# Patient Record
Sex: MALE | Race: WHITE | NOT HISPANIC OR LATINO | Employment: UNEMPLOYED | URBAN - METROPOLITAN AREA
[De-identification: names, ages, dates, MRNs, and addresses within clinical notes are randomized per-mention and may not be internally consistent; named-entity substitution may affect disease eponyms.]

---

## 2022-01-31 ENCOUNTER — HOSPITAL ENCOUNTER (INPATIENT)
Facility: HOSPITAL | Age: 47
LOS: 1 days | Discharge: HOME | DRG: 897 | End: 2022-02-01
Attending: EMERGENCY MEDICINE | Admitting: FAMILY MEDICINE

## 2022-01-31 DIAGNOSIS — R41.82 AMS (ALTERED MENTAL STATUS): ICD-10-CM

## 2022-01-31 DIAGNOSIS — F19.10 DRUG ABUSE: Primary | ICD-10-CM

## 2022-01-31 PROBLEM — F15.921: Status: ACTIVE | Noted: 2022-01-31

## 2022-01-31 PROBLEM — R45.1 AGITATION: Status: ACTIVE | Noted: 2022-01-31

## 2022-01-31 PROBLEM — T50.901A DRUG OVERDOSE: Status: ACTIVE | Noted: 2022-01-31

## 2022-01-31 PROBLEM — D72.829 LEUKOCYTOSIS: Status: ACTIVE | Noted: 2022-01-31

## 2022-01-31 LAB
ALBUMIN SERPL BCP-MCNC: 4.4 G/DL (ref 3.5–5.2)
ALP SERPL-CCNC: 65 U/L (ref 55–135)
ALT SERPL W/O P-5'-P-CCNC: 17 U/L (ref 10–44)
AMMONIA PLAS-SCNC: 45 UMOL/L (ref 10–50)
AMPHET+METHAMPHET UR QL: ABNORMAL
ANION GAP SERPL CALC-SCNC: 15 MMOL/L (ref 8–16)
AST SERPL-CCNC: 28 U/L (ref 10–40)
BARBITURATES UR QL SCN>200 NG/ML: NEGATIVE
BASOPHILS # BLD AUTO: 0.11 K/UL (ref 0–0.2)
BASOPHILS NFR BLD: 0.8 % (ref 0–1.9)
BENZODIAZ UR QL SCN>200 NG/ML: NEGATIVE
BILIRUB SERPL-MCNC: 0.8 MG/DL (ref 0.1–1)
BILIRUB UR QL STRIP: NEGATIVE
BNP SERPL-MCNC: <10 PG/ML (ref 0–99)
BUN SERPL-MCNC: 14 MG/DL (ref 6–20)
BZE UR QL SCN: NEGATIVE
CALCIUM SERPL-MCNC: 9.5 MG/DL (ref 8.7–10.5)
CANNABINOIDS UR QL SCN: NEGATIVE
CHLORIDE SERPL-SCNC: 105 MMOL/L (ref 95–110)
CK SERPL-CCNC: 170 U/L (ref 20–200)
CLARITY UR: CLEAR
CO2 SERPL-SCNC: 20 MMOL/L (ref 23–29)
COLOR UR: YELLOW
CREAT SERPL-MCNC: 0.8 MG/DL (ref 0.5–1.4)
CREAT UR-MCNC: 70.4 MG/DL (ref 23–375)
CTP QC/QA: YES
DIFFERENTIAL METHOD: ABNORMAL
EOSINOPHIL # BLD AUTO: 0.3 K/UL (ref 0–0.5)
EOSINOPHIL NFR BLD: 2.5 % (ref 0–8)
ERYTHROCYTE [DISTWIDTH] IN BLOOD BY AUTOMATED COUNT: 11.6 % (ref 11.5–14.5)
EST. GFR  (AFRICAN AMERICAN): >60 ML/MIN/1.73 M^2
EST. GFR  (NON AFRICAN AMERICAN): >60 ML/MIN/1.73 M^2
ETHANOL SERPL-MCNC: 81 MG/DL
ETHANOL SERPL-MCNC: <10 MG/DL
GLUCOSE SERPL-MCNC: 91 MG/DL (ref 70–110)
GLUCOSE UR QL STRIP: NEGATIVE
HCT VFR BLD AUTO: 39.2 % (ref 40–54)
HGB BLD-MCNC: 13.8 G/DL (ref 14–18)
HGB UR QL STRIP: NEGATIVE
IMM GRANULOCYTES # BLD AUTO: 0.05 K/UL (ref 0–0.04)
IMM GRANULOCYTES NFR BLD AUTO: 0.4 % (ref 0–0.5)
KETONES UR QL STRIP: ABNORMAL
LEUKOCYTE ESTERASE UR QL STRIP: NEGATIVE
LYMPHOCYTES # BLD AUTO: 2.3 K/UL (ref 1–4.8)
LYMPHOCYTES NFR BLD: 16.8 % (ref 18–48)
MCH RBC QN AUTO: 30.8 PG (ref 27–31)
MCHC RBC AUTO-ENTMCNC: 35.2 G/DL (ref 32–36)
MCV RBC AUTO: 88 FL (ref 82–98)
METHADONE UR QL SCN>300 NG/ML: NEGATIVE
MONOCYTES # BLD AUTO: 0.8 K/UL (ref 0.3–1)
MONOCYTES NFR BLD: 5.7 % (ref 4–15)
NEUTROPHILS # BLD AUTO: 10 K/UL (ref 1.8–7.7)
NEUTROPHILS NFR BLD: 73.8 % (ref 38–73)
NITRITE UR QL STRIP: NEGATIVE
NRBC BLD-RTO: 0 /100 WBC
OPIATES UR QL SCN: ABNORMAL
PCP UR QL SCN>25 NG/ML: NEGATIVE
PH UR STRIP: >8 [PH] (ref 5–8)
PLATELET # BLD AUTO: 340 K/UL (ref 150–450)
PMV BLD AUTO: 8.8 FL (ref 9.2–12.9)
POTASSIUM SERPL-SCNC: 3.9 MMOL/L (ref 3.5–5.1)
PROT SERPL-MCNC: 7.5 G/DL (ref 6–8.4)
PROT UR QL STRIP: NEGATIVE
RBC # BLD AUTO: 4.48 M/UL (ref 4.6–6.2)
SARS-COV-2 RDRP RESP QL NAA+PROBE: NEGATIVE
SODIUM SERPL-SCNC: 140 MMOL/L (ref 136–145)
SP GR UR STRIP: 1.01 (ref 1–1.03)
T4 FREE SERPL-MCNC: 1.09 NG/DL (ref 0.71–1.51)
TOXICOLOGY INFORMATION: ABNORMAL
TROPONIN I SERPL DL<=0.01 NG/ML-MCNC: <0.006 NG/ML (ref 0–0.03)
TSH SERPL DL<=0.005 MIU/L-ACNC: 0.33 UIU/ML (ref 0.4–4)
URN SPEC COLLECT METH UR: ABNORMAL
UROBILINOGEN UR STRIP-ACNC: NEGATIVE EU/DL
WBC # BLD AUTO: 13.52 K/UL (ref 3.9–12.7)

## 2022-01-31 PROCEDURE — 83880 ASSAY OF NATRIURETIC PEPTIDE: CPT | Performed by: EMERGENCY MEDICINE

## 2022-01-31 PROCEDURE — 25000003 PHARM REV CODE 250: Performed by: EMERGENCY MEDICINE

## 2022-01-31 PROCEDURE — 81003 URINALYSIS AUTO W/O SCOPE: CPT | Mod: 59 | Performed by: EMERGENCY MEDICINE

## 2022-01-31 PROCEDURE — 63600175 PHARM REV CODE 636 W HCPCS: Performed by: NURSE PRACTITIONER

## 2022-01-31 PROCEDURE — 99291 CRITICAL CARE FIRST HOUR: CPT | Mod: ,,, | Performed by: NURSE PRACTITIONER

## 2022-01-31 PROCEDURE — 85025 COMPLETE CBC W/AUTO DIFF WBC: CPT | Performed by: EMERGENCY MEDICINE

## 2022-01-31 PROCEDURE — 20000000 HC ICU ROOM

## 2022-01-31 PROCEDURE — 36415 COLL VENOUS BLD VENIPUNCTURE: CPT | Performed by: NURSE PRACTITIONER

## 2022-01-31 PROCEDURE — C9399 UNCLASSIFIED DRUGS OR BIOLOG: HCPCS | Performed by: EMERGENCY MEDICINE

## 2022-01-31 PROCEDURE — 82077 ASSAY SPEC XCP UR&BREATH IA: CPT | Performed by: NURSE PRACTITIONER

## 2022-01-31 PROCEDURE — 96372 THER/PROPH/DIAG INJ SC/IM: CPT

## 2022-01-31 PROCEDURE — 96376 TX/PRO/DX INJ SAME DRUG ADON: CPT

## 2022-01-31 PROCEDURE — 25000003 PHARM REV CODE 250: Performed by: FAMILY MEDICINE

## 2022-01-31 PROCEDURE — 82140 ASSAY OF AMMONIA: CPT | Performed by: EMERGENCY MEDICINE

## 2022-01-31 PROCEDURE — 84443 ASSAY THYROID STIM HORMONE: CPT | Performed by: EMERGENCY MEDICINE

## 2022-01-31 PROCEDURE — 80053 COMPREHEN METABOLIC PANEL: CPT | Performed by: EMERGENCY MEDICINE

## 2022-01-31 PROCEDURE — 96374 THER/PROPH/DIAG INJ IV PUSH: CPT

## 2022-01-31 PROCEDURE — 82550 ASSAY OF CK (CPK): CPT | Performed by: EMERGENCY MEDICINE

## 2022-01-31 PROCEDURE — 25000003 PHARM REV CODE 250: Performed by: NURSE PRACTITIONER

## 2022-01-31 PROCEDURE — 63600175 PHARM REV CODE 636 W HCPCS: Performed by: INTERNAL MEDICINE

## 2022-01-31 PROCEDURE — 84484 ASSAY OF TROPONIN QUANT: CPT | Performed by: EMERGENCY MEDICINE

## 2022-01-31 PROCEDURE — 84439 ASSAY OF FREE THYROXINE: CPT | Performed by: EMERGENCY MEDICINE

## 2022-01-31 PROCEDURE — 96375 TX/PRO/DX INJ NEW DRUG ADDON: CPT

## 2022-01-31 PROCEDURE — 63600175 PHARM REV CODE 636 W HCPCS: Performed by: EMERGENCY MEDICINE

## 2022-01-31 PROCEDURE — 82077 ASSAY SPEC XCP UR&BREATH IA: CPT | Mod: 91 | Performed by: EMERGENCY MEDICINE

## 2022-01-31 PROCEDURE — 99291 PR CRITICAL CARE, E/M 30-74 MINUTES: ICD-10-PCS | Mod: ,,, | Performed by: NURSE PRACTITIONER

## 2022-01-31 PROCEDURE — 80307 DRUG TEST PRSMV CHEM ANLYZR: CPT | Performed by: EMERGENCY MEDICINE

## 2022-01-31 PROCEDURE — 99285 EMERGENCY DEPT VISIT HI MDM: CPT | Mod: 25

## 2022-01-31 PROCEDURE — U0002 COVID-19 LAB TEST NON-CDC: HCPCS | Performed by: EMERGENCY MEDICINE

## 2022-01-31 PROCEDURE — 96361 HYDRATE IV INFUSION ADD-ON: CPT

## 2022-01-31 RX ORDER — DEXMEDETOMIDINE HYDROCHLORIDE 4 UG/ML
0-1.4 INJECTION INTRAVENOUS CONTINUOUS
Status: DISCONTINUED | OUTPATIENT
Start: 2022-01-31 | End: 2022-02-01

## 2022-01-31 RX ORDER — DIPHENHYDRAMINE HYDROCHLORIDE 50 MG/ML
25 INJECTION INTRAMUSCULAR; INTRAVENOUS
Status: COMPLETED | OUTPATIENT
Start: 2022-01-31 | End: 2022-01-31

## 2022-01-31 RX ORDER — LORAZEPAM 2 MG/ML
2 INJECTION INTRAMUSCULAR EVERY 4 HOURS PRN
Status: DISCONTINUED | OUTPATIENT
Start: 2022-01-31 | End: 2022-02-01

## 2022-01-31 RX ORDER — LORAZEPAM 2 MG/ML
2 INJECTION INTRAMUSCULAR
Status: DISCONTINUED | OUTPATIENT
Start: 2022-01-31 | End: 2022-01-31

## 2022-01-31 RX ORDER — MUPIROCIN 20 MG/G
OINTMENT TOPICAL 2 TIMES DAILY
Status: DISCONTINUED | OUTPATIENT
Start: 2022-01-31 | End: 2022-02-01 | Stop reason: HOSPADM

## 2022-01-31 RX ORDER — ONDANSETRON 2 MG/ML
4 INJECTION INTRAMUSCULAR; INTRAVENOUS EVERY 8 HOURS PRN
Status: DISCONTINUED | OUTPATIENT
Start: 2022-01-31 | End: 2022-02-01 | Stop reason: HOSPADM

## 2022-01-31 RX ORDER — SODIUM CHLORIDE 0.9 % (FLUSH) 0.9 %
10 SYRINGE (ML) INJECTION
Status: DISCONTINUED | OUTPATIENT
Start: 2022-01-31 | End: 2022-02-01 | Stop reason: HOSPADM

## 2022-01-31 RX ORDER — DIAZEPAM 10 MG/2ML
10 INJECTION INTRAMUSCULAR ONCE
Status: COMPLETED | OUTPATIENT
Start: 2022-01-31 | End: 2022-01-31

## 2022-01-31 RX ORDER — HYDRALAZINE HYDROCHLORIDE 20 MG/ML
10 INJECTION INTRAMUSCULAR; INTRAVENOUS EVERY 6 HOURS PRN
Status: DISCONTINUED | OUTPATIENT
Start: 2022-01-31 | End: 2022-02-01 | Stop reason: HOSPADM

## 2022-01-31 RX ORDER — LORAZEPAM 2 MG/ML
1 INJECTION INTRAMUSCULAR
Status: COMPLETED | OUTPATIENT
Start: 2022-01-31 | End: 2022-01-31

## 2022-01-31 RX ORDER — HALOPERIDOL 5 MG/ML
5 INJECTION INTRAMUSCULAR
Status: COMPLETED | OUTPATIENT
Start: 2022-01-31 | End: 2022-01-31

## 2022-01-31 RX ORDER — DIPHENHYDRAMINE HYDROCHLORIDE 50 MG/ML
25 INJECTION INTRAMUSCULAR; INTRAVENOUS
Status: DISCONTINUED | OUTPATIENT
Start: 2022-01-31 | End: 2022-01-31

## 2022-01-31 RX ORDER — LORAZEPAM 2 MG/ML
2 INJECTION INTRAMUSCULAR
Status: COMPLETED | OUTPATIENT
Start: 2022-01-31 | End: 2022-01-31

## 2022-01-31 RX ORDER — ETOMIDATE 2 MG/ML
INJECTION INTRAVENOUS
Status: DISCONTINUED
Start: 2022-01-31 | End: 2022-01-31 | Stop reason: WASHOUT

## 2022-01-31 RX ORDER — ZIPRASIDONE MESYLATE 20 MG/ML
20 INJECTION, POWDER, LYOPHILIZED, FOR SOLUTION INTRAMUSCULAR
Status: COMPLETED | OUTPATIENT
Start: 2022-01-31 | End: 2022-01-31

## 2022-01-31 RX ORDER — DIAZEPAM 10 MG/2ML
10 INJECTION INTRAMUSCULAR ONCE
Status: DISCONTINUED | OUTPATIENT
Start: 2022-01-31 | End: 2022-01-31

## 2022-01-31 RX ORDER — SUCCINYLCHOLINE CHLORIDE 20 MG/ML
INJECTION INTRAMUSCULAR; INTRAVENOUS
Status: DISCONTINUED
Start: 2022-01-31 | End: 2022-01-31 | Stop reason: WASHOUT

## 2022-01-31 RX ORDER — FAMOTIDINE 10 MG/ML
20 INJECTION INTRAVENOUS 2 TIMES DAILY
Status: DISCONTINUED | OUTPATIENT
Start: 2022-01-31 | End: 2022-02-01

## 2022-01-31 RX ORDER — ENOXAPARIN SODIUM 100 MG/ML
40 INJECTION SUBCUTANEOUS EVERY 24 HOURS
Status: DISCONTINUED | OUTPATIENT
Start: 2022-01-31 | End: 2022-02-01 | Stop reason: HOSPADM

## 2022-01-31 RX ADMIN — DEXMEDETOMIDINE HYDROCHLORIDE 0.4 MCG/KG/HR: 4 INJECTION INTRAVENOUS at 10:01

## 2022-01-31 RX ADMIN — FOLIC ACID: 5 INJECTION, SOLUTION INTRAMUSCULAR; INTRAVENOUS; SUBCUTANEOUS at 03:01

## 2022-01-31 RX ADMIN — DEXMEDETOMIDINE HYDROCHLORIDE 1 MCG/KG/HR: 4 INJECTION INTRAVENOUS at 10:01

## 2022-01-31 RX ADMIN — LORAZEPAM 2 MG: 2 INJECTION INTRAMUSCULAR at 07:01

## 2022-01-31 RX ADMIN — DEXMEDETOMIDINE HYDROCHLORIDE 1.2 MCG/KG/HR: 4 INJECTION INTRAVENOUS at 10:01

## 2022-01-31 RX ADMIN — LORAZEPAM 2 MG: 2 INJECTION INTRAMUSCULAR; INTRAVENOUS at 06:01

## 2022-01-31 RX ADMIN — MUPIROCIN: 20 OINTMENT TOPICAL at 09:01

## 2022-01-31 RX ADMIN — FAMOTIDINE 20 MG: 10 INJECTION INTRAVENOUS at 09:01

## 2022-01-31 RX ADMIN — LORAZEPAM 1 MG: 2 INJECTION INTRAMUSCULAR; INTRAVENOUS at 09:01

## 2022-01-31 RX ADMIN — DIPHENHYDRAMINE HYDROCHLORIDE 25 MG: 50 INJECTION, SOLUTION INTRAMUSCULAR; INTRAVENOUS at 07:01

## 2022-01-31 RX ADMIN — DEXMEDETOMIDINE HYDROCHLORIDE 1 MCG/KG/HR: 4 INJECTION INTRAVENOUS at 05:01

## 2022-01-31 RX ADMIN — DIAZEPAM 10 MG: 10 INJECTION, SOLUTION INTRAMUSCULAR; INTRAVENOUS at 11:01

## 2022-01-31 RX ADMIN — DEXMEDETOMIDINE HYDROCHLORIDE 1.4 MCG/KG/HR: 4 INJECTION INTRAVENOUS at 02:01

## 2022-01-31 RX ADMIN — HALOPERIDOL LACTATE 5 MG: 5 INJECTION, SOLUTION INTRAMUSCULAR at 07:01

## 2022-01-31 RX ADMIN — ZIPRASIDONE MESYLATE 20 MG: 20 INJECTION, POWDER, LYOPHILIZED, FOR SOLUTION INTRAMUSCULAR at 09:01

## 2022-01-31 RX ADMIN — DEXMEDETOMIDINE HYDROCHLORIDE 1 MCG/KG/HR: 4 INJECTION INTRAVENOUS at 11:01

## 2022-01-31 RX ADMIN — DEXMEDETOMIDINE HYDROCHLORIDE 0.6 MCG/KG/HR: 4 INJECTION INTRAVENOUS at 10:01

## 2022-01-31 RX ADMIN — ENOXAPARIN SODIUM 40 MG: 100 INJECTION SUBCUTANEOUS at 05:01

## 2022-01-31 RX ADMIN — SODIUM CHLORIDE 1000 ML: 0.9 INJECTION, SOLUTION INTRAVENOUS at 07:01

## 2022-01-31 RX ADMIN — SODIUM CHLORIDE 1000 ML: 0.9 INJECTION, SOLUTION INTRAVENOUS at 09:01

## 2022-01-31 RX ADMIN — MUPIROCIN: 20 OINTMENT TOPICAL at 11:01

## 2022-01-31 NOTE — ASSESSMENT & PLAN NOTE
Endorses snorting methamphetamine  Valium given IM for safety with loss of IV access and uncontrolled hyperactive and spastic behaviour  precedex infusion, RASS goal 0 to -1  Prn benzo  ICU neuro and respiratory monitoring  Soft wrist restraints to prevent pulling lines  CPK wnl on presentation; repeat in am given degree of hyperactive agitation

## 2022-01-31 NOTE — CONSULTS
[facility] ([facility])  Critical Care Medicine  Consult Note    Patient Name: Edinson Hamm  MRN: [id number]  Admission Date: 1/31/2022  Hospital Length of Stay: 0 days  Code Status: Full Code  Attending Physician: Nishant Kapoor MD   Primary Care Provider: Primary Doctor No   Principal Problem: Amphetamine and psychostimulant intoxication with delirium      Subjective:     HPI:  46 year old male with unknown PMH presented to ED with hyperactive agitation  Brought in by friend who reported pt awakening this am agitated, yelling, and would only answer that he had rectal pain    ED evaluation revealed leukocytosis, low TSH 0.328, low CO2 20, UDS presumptive + opiates and amphetamines  Labs otherwise unremarkable  Required multiple modalities to attempt sedation in ED for aggressive agitation. Given benadryl IV, ativan total 3mg IV, geodon 20mg IM, haldol 5mg    Admitted to ICU with precedex infusion and soft restraints, still agitated despite all above + precedex bolus      Hospital/ICU Course:  Seen and examined on arrival to ICU  Agitated, hyperactive, only momentarily redirectable on precedex max infusion      History reviewed. No pertinent past medical history.    History reviewed. No pertinent surgical history.    Review of patient's allergies indicates:  No Known Allergies    Family History     Family history is unknown by patient.        Tobacco Use    Smoking status: Not on file    Smokeless tobacco: Not on file   Substance and Sexual Activity    Alcohol use: Yes    Drug use: Yes     Types: Amphetamines    Sexual activity: Not on file         Review of Systems   Unable to perform ROS: Other (unable to provide ROS due to agitation)     Objective:     Vital Signs (Most Recent):  Temp: 97.7 °F (36.5 °C) (01/31/22 1120)  Pulse: 73 (01/31/22 1400)  Resp: (!) 25 (01/31/22 1400)  BP: 138/87 (01/31/22 1400)  SpO2: 97 % (01/31/22 1400) Vital Signs (24h Range):  Temp:  [97.7 °F (36.5 °C)-98.5 °F (36.9  °C)] 97.7 °F (36.5 °C)  Pulse:  [] 73  Resp:  [20-30] 25  SpO2:  [97 %-100 %] 97 %  BP: (111-145)/(73-93) 138/87     Weight: 66.7 kg (147 lb)  Body mass index is 19.94 kg/m².      Intake/Output Summary (Last 24 hours) at 1/31/2022 1447  Last data filed at 1/31/2022 1400  Gross per 24 hour   Intake 2107.22 ml   Output 250 ml   Net 1857.22 ml      dexmedetomidine (PRECEDEX) infusion 1.4 mcg/kg/hr (01/31/22 1404)       Physical Exam  Vitals and nursing note reviewed.   Constitutional:       General: He is in acute distress.      Appearance: He is underweight. He is ill-appearing.      Interventions: He is restrained.   HENT:      Head: Atraumatic.   Eyes:      Conjunctiva/sclera: Conjunctivae normal.      Comments: Pupils pinpoint bilaterally   Cardiovascular:      Rate and Rhythm: Regular rhythm. Tachycardia present.      Pulses:           Radial pulses are 2+ on the right side and 2+ on the left side.        Dorsalis pedis pulses are 2+ on the right side and 2+ on the left side.   Pulmonary:      Effort: Tachypnea present.      Breath sounds: Decreased breath sounds present.   Abdominal:      General: Bowel sounds are normal. There is no distension.      Palpations: Abdomen is soft.   Musculoskeletal:      Right lower leg: No edema.      Left lower leg: No edema.   Skin:     General: Skin is warm and dry.      Capillary Refill: Capillary refill takes less than 2 seconds.      Comments: flushed   Neurological:      Comments: Labile from resting briefly to spastic myoclonic appearing movement, seemingly uncontrollable  Very limited verbal responses, disoriented  No focal motor deficits   Psychiatric:         Attention and Perception: He is inattentive.         Mood and Affect: Mood is anxious. Affect is labile.         Behavior: Behavior is agitated and hyperactive.         Judgment: Judgment is impulsive.         Vents:       Lines/Drains/Airways     Peripheral Intravenous Line                 Peripheral IV -  Single Lumen 01/31/22 1145 20 G Right Upper Arm <1 day         Peripheral IV - Single Lumen 01/31/22 1400 20 G Left Hand <1 day                Significant Labs:    CBC/Anemia Profile:  Recent Labs   Lab 01/31/22  0726   WBC 13.52*   HGB 13.8*   HCT 39.2*      MCV 88   RDW 11.6        Chemistries:  Recent Labs   Lab 01/31/22  0726      K 3.9      CO2 20*   BUN 14   CREATININE 0.8   CALCIUM 9.5   ALBUMIN 4.4   PROT 7.5   BILITOT 0.8   ALKPHOS 65   ALT 17   AST 28       All pertinent labs within the past 24 hours have been reviewed.    Significant Imaging:   I have reviewed all pertinent imaging results/findings within the past 24 hours.      ABG  No results for input(s): PH, PO2, PCO2, HCO3, BE in the last 168 hours.  Assessment/Plan:     Psychiatric  * Amphetamine and psychostimulant intoxication with delirium  Endorses snorting methamphetamine  Valium given IM for safety with loss of IV access and uncontrolled hyperactive and spastic behaviour  precedex infusion, RASS goal 0 to -1  Prn benzo  ICU neuro and respiratory monitoring  Soft wrist restraints to prevent pulling lines  CPK wnl on presentation; repeat in am given degree of hyperactive agitation    Oncology  Leukocytosis  Likely reactive  Monitor trend with control of agitation post IVF resuscitation  No evidence of infectious focus      Critical Care Daily Checklist:    A: Awake: RASS Goal/Actual Goal: RASS Goal: 0-->alert and calm  Actual:     B: Spontaneous Breathing Trial Performed?     C: SAT & SBT Coordinated?  n/a                      D: Delirium: CAM-ICU     E: Early Mobility Performed? Yes   F: Feeding Goal:    Status:     Current Diet Order   No orders of the defined types were placed in this encounter.      AS: Analgesia/Sedation Precedex; prn benzo   T: Thromboembolic Prophylaxis lovenox   H: HOB > 300 Yes   U: Stress Ulcer Prophylaxis (if needed) pepcid   G: Glucose Control monitor   B: Bowel Function     I: Indwelling Catheter  (Lines & Chavez) Necessity reviewed   D: De-escalation of Antimicrobials/Pharmacotherapies reviewed    Plan for the day/ETD As above    Code Status:  Family/Goals of Care: Full Code  Home on discharge   I have discussed case and plan of care in detail with Dr Costello and Dr Kapoor; Status and plan of care were discussed with team on multidisciplinary rounds.    Critical Care Time: 50 minutes  Critical secondary to agitated delirium   Critical care was time spent personally by me on the following activities: development of treatment plan with patient or surrogate and bedside caregivers, discussions with consultants, evaluation of patient's response to treatment, examination of patient, ordering and performing treatments and interventions, ordering and review of laboratory studies, ordering and review of radiographic studies, pulse oximetry, re-evaluation of patient's condition. This critical care time did not overlap with that of any other provider or involve time for any procedures.    Thank you for your consult.      PANKAJ Emanuel-BC  Critical Care Medicine  [facility] ([facility])

## 2022-01-31 NOTE — ED NOTES
Patient becoming increasingly agitated, security, deputy, and 2 nurses at bedside. Redirection failed, calming atmosphere failed, MD notified new orders given

## 2022-01-31 NOTE — PLAN OF CARE
PT RESTING COMFORTABLY.  REMAINS AFEBRILE IN NSR TO HIGH SB.  SINCE ADMIT TO UNIT PT HAS INCREASINGLY BECOME MORE COHERENT AND LESS AGITATED.  HE STATES HE IS NOT IN PAIN AND THIRSTY.  WATER GIVEN AND TOLERATED WELL.  PT STRAINS DURING URINATION AND STATES HE HAS A UTI/UNCONFIRMED BY UA.  ATTEMPTED HILARIO CATHETER INSERTION/UNSUCCESSFUL.  PT REMAINS IN RESTRAINTS UNTIL CONSTANTLY AWARE OF SITUATION AND NOT PULLING AT LINES.  POC REVIEWED WITH PT NO EVIDENCE OF LEARNING NOTED. PT REPOSITIONS SELF IN BED.

## 2022-01-31 NOTE — ED NOTES
Unable to complete EKG on patient due to constant movement and pt being altered. Dr. Rehman notified.

## 2022-01-31 NOTE — ED PROVIDER NOTES
SCRIBE #1 NOTE: I, Baltazar Zarate, am scribing for, and in the presence of, Cristian Rehman MD. I have scribed the entire note.      History      Chief Complaint   Patient presents with    Altered Mental Status     Pt is staying with a friend, friend reports pt woke up this morning yelling and cant get the pt to answer any questions.       Review of patient's allergies indicates:  No Known Allergies     HPI   HPI    1/31/2022, 6:44 AM   History obtained from the patient's friend  HPI and ROS limited secondary to pt's AMS      History of Present Illness: Edinson Hamm is a 46 y.o. male patient who presents to the Emergency Department for AMS, onset this morning. Friend states that the pt woke up this morning yelling and would not answer questions. Per friend, pt was only able to verbalize that he had rectal pain. Symptoms are constant and moderate in severity. No mitigating or exacerbating factors reported. Associated sxs include rectal pain. No prior Tx reported. No further complaints or concerns at this time.     Arrival mode: Personal vehicle    PCP: Primary Doctor No       Past Medical History:  History reviewed. No pertinent past medical history.    Past Surgical History:  No past surgical history on file.      Family History:  No family history on file.    Social History:  Social History     Tobacco Use    Smoking status: Not on file    Smokeless tobacco: Not on file   Substance and Sexual Activity    Alcohol use: Not on file    Drug use: Not on file    Sexual activity: Not on file       ROS   Review of Systems   Unable to perform ROS: Mental status change   Gastrointestinal: Positive for rectal pain.     Physical Exam      Initial Vitals   BP Pulse Resp Temp SpO2   01/31/22 0733 01/31/22 0639 01/31/22 0639 01/31/22 0641 01/31/22 0639   111/73 (!) 112 20 98.5 °F (36.9 °C) 98 %      MAP       --                 Physical Exam  Nursing Notes and Vital Signs Reviewed.  Constitutional: Patient is in  moderate distress. Well-developed and well-nourished.  Head: Atraumatic. Normocephalic.  Eyes: PERRL. EOM intact. Conjunctivae are not pale. No scleral icterus.  ENT: Mucous membranes are moist. Oropharynx is clear and symmetric.    Neck: Supple. Full ROM.   Cardiovascular: Regular rate. Regular rhythm. No murmurs, rubs, or gallops. Distal pulses are 2+ and symmetric.  Pulmonary/Chest: No respiratory distress. Clear to auscultation bilaterally. No wheezing or rales.  Abdominal: Soft and non-distended.  There is no tenderness.  No rebound, guarding, or rigidity.   Musculoskeletal: Moves all extremities. No obvious deformities. No edema.  Skin: Diaphoretic.  Neurological:  Awake and alert, but does not answer any questions.    ED Course    Procedures  ED Vital Signs:  Vitals:    01/31/22 0639 01/31/22 0641 01/31/22 0733 01/31/22 0808   BP:   111/73    Pulse: (!) 112  99    Resp: 20  (!) 24    Temp:  98.5 °F (36.9 °C)     TempSrc:  Oral     SpO2: 98%  100%    Weight:    66.7 kg (147 lb)   Height:    6' (1.829 m)    01/31/22 0915   BP: (!) 145/79   Pulse: 109   Resp: 20   Temp:    TempSrc:    SpO2: 99%   Weight:    Height:        Abnormal Lab Results:  Labs Reviewed   CBC W/ AUTO DIFFERENTIAL - Abnormal; Notable for the following components:       Result Value    WBC 13.52 (*)     RBC 4.48 (*)     Hemoglobin 13.8 (*)     Hematocrit 39.2 (*)     MPV 8.8 (*)     Gran # (ANC) 10.0 (*)     Immature Grans (Abs) 0.05 (*)     Gran % 73.8 (*)     Lymph % 16.8 (*)     All other components within normal limits   COMPREHENSIVE METABOLIC PANEL - Abnormal; Notable for the following components:    CO2 20 (*)     All other components within normal limits   URINALYSIS, REFLEX TO URINE CULTURE - Abnormal; Notable for the following components:    pH, UA >8.0 (*)     Ketones, UA Trace (*)     All other components within normal limits    Narrative:     Specimen Source->Urine   DRUG SCREEN PANEL, URINE EMERGENCY - Abnormal; Notable for the  following components:    Opiate Scrn, Ur Presumptive Positive (*)     Amphetamine Screen, Ur Presumptive Positive (*)     All other components within normal limits   ALCOHOL,MEDICAL (ETHANOL) - Abnormal; Notable for the following components:    Alcohol, Serum 81 (*)     All other components within normal limits   TSH - Abnormal; Notable for the following components:    TSH 0.328 (*)     All other components within normal limits   B-TYPE NATRIURETIC PEPTIDE   CK   TROPONIN I   AMMONIA   T4, FREE   SARS-COV-2 RDRP GENE    Narrative:     This test utilizes isothermal nucleic acid amplification   technology to detect the SARS-CoV-2 RdRp nucleic acid segment.   The analytical sensitivity (limit of detection) is 125 genome   equivalents/mL.   A POSITIVE result implies infection with the SARS-CoV-2 virus;   the patient is presumed to be contagious.     A NEGATIVE result means that SARS-CoV-2 nucleic acids are not   present above the limit of detection. A NEGATIVE result should be   treated as presumptive. It does not rule out the possibility of   COVID-19 and should not be the sole basis for treatment decisions.   If COVID-19 is strongly suspected based on clinical and exposure   history, re-testing using an alternate molecular assay should be   considered.   This test is only for use under the Food and Drug   Administration s Emergency Use Authorization (EUA).   Commercial kits are provided by [employer].   Performance characteristics of the EUA have been independently   verified by [facility] Department of   Pathology and Laboratory Medicine.   _________________________________________________________________   The authorized Fact Sheet for Healthcare Providers and the authorized Fact   Sheet for Patients of the ID NOW COVID-19 are available on the FDA   website:     https://www.fda.gov/media/294300/download  https://www.fda.gov/media/593974/download              All Lab Results:  Results for orders  placed or performed during the hospital encounter of 01/31/22   CBC Auto Differential   Result Value Ref Range    WBC 13.52 (H) 3.90 - 12.70 K/uL    RBC 4.48 (L) 4.60 - 6.20 M/uL    Hemoglobin 13.8 (L) 14.0 - 18.0 g/dL    Hematocrit 39.2 (L) 40.0 - 54.0 %    MCV 88 82 - 98 fL    MCH 30.8 27.0 - 31.0 pg    MCHC 35.2 32.0 - 36.0 g/dL    RDW 11.6 11.5 - 14.5 %    Platelets 340 150 - 450 K/uL    MPV 8.8 (L) 9.2 - 12.9 fL    Immature Granulocytes 0.4 0.0 - 0.5 %    Gran # (ANC) 10.0 (H) 1.8 - 7.7 K/uL    Immature Grans (Abs) 0.05 (H) 0.00 - 0.04 K/uL    Lymph # 2.3 1.0 - 4.8 K/uL    Mono # 0.8 0.3 - 1.0 K/uL    Eos # 0.3 0.0 - 0.5 K/uL    Baso # 0.11 0.00 - 0.20 K/uL    nRBC 0 0 /100 WBC    Gran % 73.8 (H) 38.0 - 73.0 %    Lymph % 16.8 (L) 18.0 - 48.0 %    Mono % 5.7 4.0 - 15.0 %    Eosinophil % 2.5 0.0 - 8.0 %    Basophil % 0.8 0.0 - 1.9 %    Differential Method Automated    Comprehensive Metabolic Panel   Result Value Ref Range    Sodium 140 136 - 145 mmol/L    Potassium 3.9 3.5 - 5.1 mmol/L    Chloride 105 95 - 110 mmol/L    CO2 20 (L) 23 - 29 mmol/L    Glucose 91 70 - 110 mg/dL    BUN 14 6 - 20 mg/dL    Creatinine 0.8 0.5 - 1.4 mg/dL    Calcium 9.5 8.7 - 10.5 mg/dL    Total Protein 7.5 6.0 - 8.4 g/dL    Albumin 4.4 3.5 - 5.2 g/dL    Total Bilirubin 0.8 0.1 - 1.0 mg/dL    Alkaline Phosphatase 65 55 - 135 U/L    AST 28 10 - 40 U/L    ALT 17 10 - 44 U/L    Anion Gap 15 8 - 16 mmol/L    eGFR if African American >60 >60 mL/min/1.73 m^2    eGFR if non African American >60 >60 mL/min/1.73 m^2   Urinalysis, Reflex to Urine Culture Urine, Clean Catch    Specimen: Urine   Result Value Ref Range    Specimen UA Urine, Clean Catch     Color, UA Yellow Yellow, Straw, Annabel    Appearance, UA Clear Clear    pH, UA >8.0 (A) 5.0 - 8.0    Specific Gravity, UA 1.015 1.005 - 1.030    Protein, UA Negative Negative    Glucose, UA Negative Negative    Ketones, UA Trace (A) Negative    Bilirubin (UA) Negative Negative    Occult Blood UA  Negative Negative    Nitrite, UA Negative Negative    Urobilinogen, UA Negative <2.0 EU/dL    Leukocytes, UA Negative Negative   BNP   Result Value Ref Range    BNP <10 0 - 99 pg/mL   CK   Result Value Ref Range     20 - 200 U/L   Troponin I   Result Value Ref Range    Troponin I <0.006 0.000 - 0.026 ng/mL   Drug screen panel, in-house   Result Value Ref Range    Benzodiazepines Negative Negative    Methadone metabolites Negative Negative    Cocaine (Metab.) Negative Negative    Opiate Scrn, Ur Presumptive Positive (A) Negative    Barbiturate Screen, Ur Negative Negative    Amphetamine Screen, Ur Presumptive Positive (A) Negative    THC Negative Negative    Phencyclidine Negative Negative    Creatinine, Urine 70.4 23.0 - 375.0 mg/dL    Toxicology Information SEE COMMENT    Ethanol   Result Value Ref Range    Alcohol, Serum 81 (H) <10 mg/dL   TSH   Result Value Ref Range    TSH 0.328 (L) 0.400 - 4.000 uIU/mL   Ammonia   Result Value Ref Range    Ammonia 45 10 - 50 umol/L   T4, Free   Result Value Ref Range    Free T4 1.09 0.71 - 1.51 ng/dL   POCT COVID-19 Rapid Screening   Result Value Ref Range    POC Rapid COVID Negative Negative     Acceptable Yes      Imaging Results:  Imaging Results          CT Head Without Contrast (Final result)  Result time 01/31/22 07:58:31    Final result by Hung Teran MD (01/31/22 07:58:31)                 Impression:      Limited by motion.  No gross abnormality.      Electronically signed by: Hung Teran MD  Date:    01/31/2022  Time:    07:58             Narrative:    EXAMINATION:  CT HEAD WITHOUT CONTRAST    CLINICAL HISTORY:  Transient alteration of awareness.    TECHNIQUE:  Standard noncontrast CT of the brain.  Severely limited by motion.    All CT scans at this facility are performed  using dose modulation techniques as appropriate to performed exam including the following:  automated exposure control; adjustment of mA and/or kV according to  the patients size (this includes techniques or standardized protocols for targeted exams where dose is matched to indication/reason for exam: i.e. extremities or head);  iterative reconstruction technique.    COMPARISON:  None.    FINDINGS:  The ventricles are nonenlarged.  No acute hemorrhage, edema or mass effect is identified.    The skull is grossly normal.                               X-Ray Chest AP Portable (Final result)  Result time 01/31/22 07:55:29    Final result by Hung Teran MD (01/31/22 07:55:29)                 Impression:      No acute findings.      Electronically signed by: Hung Teran MD  Date:    01/31/2022  Time:    07:55             Narrative:    EXAMINATION:  XR CHEST AP PORTABLE    CLINICAL HISTORY:  Respiratory distress., Ams;    COMPARISON:  None    FINDINGS:  Heart size is normal.  The lung fields appear clear at this time.    Old right rib fracture.                                        The Emergency Provider reviewed the vital signs and test results, which are outlined above.    ED Discussion     10:16 AM: Discussed case with Marivel Chisholm NP ([facility]). Dr. Kapoor agrees with current care and management of pt and accepts admission.   Admitting Service: [facility]  Admitting Physician: Dr. Kapoor  Admit to: ICU    10:17 AM: Re-evaluated pt. I have discussed test results, shared treatment plan, and the need for admission with patient and family at bedside. Pt and family express understanding at this time and agree with all information. All questions answered. Pt and family have no further questions or concerns at this time. Pt is ready for admit.         ED Medication(s):  Medications   dexmedetomidine (PRECEDEX) 400mcg/100mL dextrose 5% infusion (0.6 mcg/kg/hr × 66.7 kg Intravenous New Bag 1/31/22 1022)   sodium chloride 0.9% bolus 1,000 mL (0 mLs Intravenous Stopped 1/31/22 0820)   haloperidol lactate injection 5 mg (5 mg Intramuscular Given 1/31/22 0719)    lorazepam injection 2 mg (2 mg Intravenous Given 1/31/22 0710)   diphenhydrAMINE injection 25 mg (25 mg Intravenous Given 1/31/22 0720)   ziprasidone injection 20 mg (20 mg Intramuscular Given 1/31/22 0911)   sodium chloride 0.9% bolus 1,000 mL (1,000 mLs Intravenous New Bag 1/31/22 0909)   lorazepam injection 1 mg (1 mg Intravenous Given 1/31/22 0955)   dexmedetomidine IV bolus from bag/infusion 66.7 mcg (66.7 mcg Intravenous Bolus from Bag 1/31/22 1000)          New Prescriptions    No medications on file         Medical Decision Making    Medical Decision Making:   Clinical Tests:   Lab Tests: Ordered and Reviewed  Radiological Study: Ordered and Reviewed           Scribe Attestation:   Scribe #1: I performed the above scribed service and the documentation accurately describes the services I performed. I attest to the accuracy of the note.    Attending:   Physician Attestation Statement for Scribe #1: I, Cristian Rehman MD, personally performed the services described in this documentation, as scribed by Baltazar Zarate, in my presence, and it is both accurate and complete.          Clinical Impression       ICD-10-CM ICD-9-CM   1. Drug abuse  F19.10 305.90   2. AMS (altered mental status)  R41.82 780.97       Disposition:   Disposition: Admitted  Condition: Serious         Cristian Rehman MD  01/31/22 4859

## 2022-01-31 NOTE — HPI
46 year old male with unknown PMH presented to ED with hyperactive agitation  Brought in by friend who reported pt awakening this am agitated, yelling, and would only answer that he had rectal pain    ED evaluation revealed leukocytosis, low TSH 0.328, low CO2 20, UDS presumptive + opiates and amphetamines  Labs otherwise unremarkable  Required multiple modalities to attempt sedation in ED for aggressive agitation. Given benadryl IV, ativan total 3mg IV, geodon 20mg IM, haldol 5mg    Admitted to ICU with precedex infusion and soft restraints, still agitated despite all above + precedex bolus

## 2022-01-31 NOTE — ASSESSMENT & PLAN NOTE
UDS positive for Opiates and Amphetamines   Agitated on arrival  Failed treatment with Haldol and Ativan  ICu admission for Precedex gtt  Critical care consulted  Lactic Acid pending  CTH negative for acute abnormality  ETOH level initially 80--will initiate banana bag

## 2022-01-31 NOTE — HPI
Hansel is a 46 y.o. male patient who presents to the ED for AMS, onset this morning. Friend states that the pt woke up this morning yelling and would not answer questions. Per friend, pt was only able to verbalize that he had rectal pain. He was brought to the ED by his friend.  In the ED: Drug screen presumptive positive for opioids and amphetamines. Several modalities tried in order to calm patient down but his agitation and thrashing warranted ICU admission for Precedex gtt. Alcohol 81, WBC 13.52.    contacted for admission to inpatient. Critical care team consulted.

## 2022-01-31 NOTE — SUBJECTIVE & OBJECTIVE
History reviewed. No pertinent past medical history.    History reviewed. No pertinent surgical history.    Review of patient's allergies indicates:  No Known Allergies    Family History     Family history is unknown by patient.        Tobacco Use    Smoking status: Not on file    Smokeless tobacco: Not on file   Substance and Sexual Activity    Alcohol use: Yes    Drug use: Yes     Types: Amphetamines    Sexual activity: Not on file         Review of Systems   Unable to perform ROS: Other (unable to provide ROS due to agitation)     Objective:     Vital Signs (Most Recent):  Temp: 97.7 °F (36.5 °C) (01/31/22 1120)  Pulse: 73 (01/31/22 1400)  Resp: (!) 25 (01/31/22 1400)  BP: 138/87 (01/31/22 1400)  SpO2: 97 % (01/31/22 1400) Vital Signs (24h Range):  Temp:  [97.7 °F (36.5 °C)-98.5 °F (36.9 °C)] 97.7 °F (36.5 °C)  Pulse:  [] 73  Resp:  [20-30] 25  SpO2:  [97 %-100 %] 97 %  BP: (111-145)/(73-93) 138/87     Weight: 66.7 kg (147 lb)  Body mass index is 19.94 kg/m².      Intake/Output Summary (Last 24 hours) at 1/31/2022 1447  Last data filed at 1/31/2022 1400  Gross per 24 hour   Intake 2107.22 ml   Output 250 ml   Net 1857.22 ml      dexmedetomidine (PRECEDEX) infusion 1.4 mcg/kg/hr (01/31/22 1404)       Physical Exam  Vitals and nursing note reviewed.   Constitutional:       General: He is in acute distress.      Appearance: He is underweight. He is ill-appearing.      Interventions: He is restrained.   HENT:      Head: Atraumatic.   Eyes:      Conjunctiva/sclera: Conjunctivae normal.      Comments: Pupils pinpoint bilaterally   Cardiovascular:      Rate and Rhythm: Regular rhythm. Tachycardia present.      Pulses:           Radial pulses are 2+ on the right side and 2+ on the left side.        Dorsalis pedis pulses are 2+ on the right side and 2+ on the left side.   Pulmonary:      Effort: Tachypnea present.      Breath sounds: Decreased breath sounds present.   Abdominal:      General: Bowel sounds are  normal. There is no distension.      Palpations: Abdomen is soft.   Musculoskeletal:      Right lower leg: No edema.      Left lower leg: No edema.   Skin:     General: Skin is warm and dry.      Capillary Refill: Capillary refill takes less than 2 seconds.      Comments: flushed   Neurological:      Comments: Labile from resting briefly to spastic myoclonic appearing movement, seemingly uncontrollable  Very limited verbal responses, disoriented  No focal motor deficits   Psychiatric:         Attention and Perception: He is inattentive.         Mood and Affect: Mood is anxious. Affect is labile.         Behavior: Behavior is agitated and hyperactive.         Judgment: Judgment is impulsive.         Vents:       Lines/Drains/Airways     Peripheral Intravenous Line                 Peripheral IV - Single Lumen 01/31/22 1145 20 G Right Upper Arm <1 day         Peripheral IV - Single Lumen 01/31/22 1400 20 G Left Hand <1 day                Significant Labs:    CBC/Anemia Profile:  Recent Labs   Lab 01/31/22  0726   WBC 13.52*   HGB 13.8*   HCT 39.2*      MCV 88   RDW 11.6        Chemistries:  Recent Labs   Lab 01/31/22  0726      K 3.9      CO2 20*   BUN 14   CREATININE 0.8   CALCIUM 9.5   ALBUMIN 4.4   PROT 7.5   BILITOT 0.8   ALKPHOS 65   ALT 17   AST 28       All pertinent labs within the past 24 hours have been reviewed.    Significant Imaging:   I have reviewed all pertinent imaging results/findings within the past 24 hours.

## 2022-01-31 NOTE — H&P
[facility] - Intensive Care [facility] Medicine  History & Physical    Patient Name: Edinson Hamm  MRN: [id number]  Patient Class: IP- Inpatient  Admission Date: 1/31/2022  Attending Physician: Nishant Kapoor MD   Primary Care Provider: Primary Doctor No         Patient information was obtained from ER records.     Subjective:     Principal Problem:AMS (altered mental status)    Chief Complaint:   Chief Complaint   Patient presents with    Altered Mental Status     Pt is staying with a friend, friend reports pt woke up this morning yelling and cant get the pt to answer any questions.        HPI: Hansel is a 46 y.o. male patient who presents to the ED for AMS, onset this morning. Friend states that the pt woke up this morning yelling and would not answer questions. Per friend, pt was only able to verbalize that he had rectal pain. He was brought to the ED by his friend.  In the ED: Drug screen presumptive positive for opioids and amphetamines. Several modalities tried in order to calm patient down but his agitation and thrashing warranted ICU admission for Precedex gtt. Alcohol 81, WBC 13.52.   HM contacted for admission to inpatient. Critical care team consulted.      History reviewed. No pertinent past medical history.    No past surgical history on file.    Review of patient's allergies indicates:  No Known Allergies    No current facility-administered medications on file prior to encounter.     No current outpatient medications on file prior to encounter.     Family History    None       Tobacco Use    Smoking status: Not on file    Smokeless tobacco: Not on file   Substance and Sexual Activity    Alcohol use: Not on file    Drug use: Not on file    Sexual activity: Not on file     Review of Systems   Unable to perform ROS: Mental status change     Objective:     Vital Signs (Most Recent):  Temp: 98.5 °F (36.9 °C) (01/31/22 0641)  Pulse: 105 (01/31/22 1047)  Resp: (!) 30 (01/31/22 1047)  BP: (!) 143/85  (01/31/22 1047)  SpO2: 98 % (01/31/22 1047) Vital Signs (24h Range):  Temp:  [98.5 °F (36.9 °C)] 98.5 °F (36.9 °C)  Pulse:  [] 105  Resp:  [20-30] 30  SpO2:  [98 %-100 %] 98 %  BP: (111-145)/(73-85) 143/85     Weight: 66.7 kg (147 lb)  Body mass index is 19.94 kg/m².    Physical Exam  Constitutional:       Appearance: He is toxic-appearing.   HENT:      Head: Normocephalic and atraumatic.      Nose: No congestion or rhinorrhea.      Mouth/Throat:      Pharynx: No posterior oropharyngeal erythema.   Eyes:      General: No scleral icterus.     Extraocular Movements: Extraocular movements intact.      Pupils: Pupils are equal, round, and reactive to light.   Neck:      Vascular: No carotid bruit.   Cardiovascular:      Rate and Rhythm: Regular rhythm. Tachycardia present.   Pulmonary:      Effort: Tachypnea present.      Breath sounds: No stridor. No wheezing.   Abdominal:      General: There is no distension.      Palpations: Abdomen is soft.      Tenderness: There is no abdominal tenderness. There is no guarding.   Musculoskeletal:         General: No swelling or deformity. Normal range of motion.      Cervical back: Normal range of motion and neck supple. No rigidity.   Skin:     General: Skin is warm and dry.      Capillary Refill: Capillary refill takes less than 2 seconds.      Coloration: Skin is not jaundiced.      Findings: No erythema or rash.   Neurological:      Motor: No weakness.      Comments: Oriented to person.   Disoriented to location  Thrashing and agitated            CRANIAL NERVES     CN III, IV, VI   Pupils are equal, round, and reactive to light.       Significant Labs: All pertinent labs within the past 24 hours have been reviewed.    Significant Imaging: I have reviewed all pertinent imaging results/findings within the past 24 hours.    Assessment/Plan:     * AMS (altered mental status)  Related to drug OD  Cessation counseling when patient coherent       Agitation  Precedex  gtt  Restraints PRN      Leukocytosis    Likely reactive  Monitor    Drug overdose  UDS positive for Opiates and Amphetamines   Agitated on arrival  Failed treatment with Haldol and Ativan  ICu admission for Precedex gtt  Critical care consulted  Lactic Acid pending  CTH negative for acute abnormality        VTE Risk Mitigation (From admission, onward)         Ordered     IP VTE LOW RISK PATIENT  Once         01/31/22 1405     Place sequential compression device  Until discontinued         01/31/22 1405              Critical care time spent on the evaluation and treatment of severe organ dysfunction, review of pertinent labs and imaging studies, discussions with consulting providers and discussions with patient/family: 35 minutes.     Marivel Chisholm NP  Department of Hospital Medicine   '[place] - Intensive Care ([facility])

## 2022-01-31 NOTE — ED NOTES
Pt attempting to get out of bed despite RN / sitter instructions, agitated   MD made aware, orders noted    MD order to leave cardiac leads off at this time, d/t agitation, will attempt to restart,  Pt being monitored continuously per sitter

## 2022-01-31 NOTE — ED NOTES
Bilateral soft wrist restraints placed at this time. Patient scooting to end of bed and not complying with directions. Patient is very agitated, rubber restraints used at this time, OK per Dr. Rehman.

## 2022-01-31 NOTE — SUBJECTIVE & OBJECTIVE
History reviewed. No pertinent past medical history.    No past surgical history on file.    Review of patient's allergies indicates:  No Known Allergies    No current facility-administered medications on file prior to encounter.     No current outpatient medications on file prior to encounter.     Family History    None       Tobacco Use    Smoking status: Not on file    Smokeless tobacco: Not on file   Substance and Sexual Activity    Alcohol use: Not on file    Drug use: Not on file    Sexual activity: Not on file     Review of Systems   Unable to perform ROS: Mental status change     Objective:     Vital Signs (Most Recent):  Temp: 98.5 °F (36.9 °C) (01/31/22 0641)  Pulse: 105 (01/31/22 1047)  Resp: (!) 30 (01/31/22 1047)  BP: (!) 143/85 (01/31/22 1047)  SpO2: 98 % (01/31/22 1047) Vital Signs (24h Range):  Temp:  [98.5 °F (36.9 °C)] 98.5 °F (36.9 °C)  Pulse:  [] 105  Resp:  [20-30] 30  SpO2:  [98 %-100 %] 98 %  BP: (111-145)/(73-85) 143/85     Weight: 66.7 kg (147 lb)  Body mass index is 19.94 kg/m².    Physical Exam  Constitutional:       Appearance: He is toxic-appearing.   HENT:      Head: Normocephalic and atraumatic.      Nose: No congestion or rhinorrhea.      Mouth/Throat:      Pharynx: No posterior oropharyngeal erythema.   Eyes:      General: No scleral icterus.     Extraocular Movements: Extraocular movements intact.      Pupils: Pupils are equal, round, and reactive to light.   Neck:      Vascular: No carotid bruit.   Cardiovascular:      Rate and Rhythm: Regular rhythm. Tachycardia present.   Pulmonary:      Effort: Tachypnea present.      Breath sounds: No stridor. No wheezing.   Abdominal:      General: There is no distension.      Palpations: Abdomen is soft.      Tenderness: There is no abdominal tenderness. There is no guarding.   Musculoskeletal:         General: No swelling or deformity. Normal range of motion.      Cervical back: Normal range of motion and neck supple. No rigidity.    Skin:     General: Skin is warm and dry.      Capillary Refill: Capillary refill takes less than 2 seconds.      Coloration: Skin is not jaundiced.      Findings: No erythema or rash.   Neurological:      Motor: No weakness.      Comments: Oriented to person.   Disoriented to location  Thrashing and agitated            CRANIAL NERVES     CN III, IV, VI   Pupils are equal, round, and reactive to light.       Significant Labs: All pertinent labs within the past 24 hours have been reviewed.    Significant Imaging: I have reviewed all pertinent imaging results/findings within the past 24 hours.

## 2022-01-31 NOTE — PHARMACY MED REC
"Admission Medication History     The home medication history was taken by Willard Davalos.    You may go to "Admission" then "Reconcile Home Medications" tabs to review and/or act upon these items.      The home medication list has been updated by the Pharmacy department.    Please read ALL comments highlighted in yellow.    Please address this information as you see fit.     Feel free to contact us if you have any questions or require assistance.    Unable to interview patient (altered mental status). Compiled list from [employer] analytical software and chart.  Patient not currently taking any prescription medication according to all my sources.    Medications listed below were obtained from: Analytic software- [employer] and Medical records  (Not in a hospital admission)      Potential issues to be addressed PRIOR TO DISCHARGE: NONE      Willard Davalos, [facility]  [employer] [phone]      No current outpatient medications on file prior to encounter.                           .          "

## 2022-01-31 NOTE — HOSPITAL COURSE
Seen and examined on arrival to ICU  Agitated, hyperactive, only momentarily redirectable on precedex max infusion  2/1 - last benzo 7pm last night; precedex stopped 0600; fatigued but oriented and appropriate behaviour. Denies pain, SOB, nausea

## 2022-01-31 NOTE — ASSESSMENT & PLAN NOTE
Likely reactive  Monitor trend with control of agitation post IVF resuscitation  No evidence of infectious focus   Bactrim Counseling:  I discussed with the patient the risks of sulfa antibiotics including but not limited to GI upset, allergic reaction, drug rash, diarrhea, dizziness, photosensitivity, and yeast infections.  Rarely, more serious reactions can occur including but not limited to aplastic anemia, agranulocytosis, methemoglobinemia, blood dyscrasias, liver or kidney failure, lung infiltrates or desquamative/blistering drug rashes.

## 2022-02-01 VITALS
SYSTOLIC BLOOD PRESSURE: 114 MMHG | BODY MASS INDEX: 18.96 KG/M2 | TEMPERATURE: 98 F | RESPIRATION RATE: 16 BRPM | HEART RATE: 71 BPM | OXYGEN SATURATION: 98 % | DIASTOLIC BLOOD PRESSURE: 76 MMHG | HEIGHT: 72 IN | WEIGHT: 140 LBS

## 2022-02-01 LAB
ANION GAP SERPL CALC-SCNC: 9 MMOL/L (ref 8–16)
BASOPHILS # BLD AUTO: 0.06 K/UL (ref 0–0.2)
BASOPHILS NFR BLD: 0.8 % (ref 0–1.9)
BUN SERPL-MCNC: 14 MG/DL (ref 6–20)
CALCIUM SERPL-MCNC: 8.2 MG/DL (ref 8.7–10.5)
CHLORIDE SERPL-SCNC: 106 MMOL/L (ref 95–110)
CK SERPL-CCNC: 452 U/L (ref 20–200)
CO2 SERPL-SCNC: 23 MMOL/L (ref 23–29)
CREAT SERPL-MCNC: 0.7 MG/DL (ref 0.5–1.4)
DIFFERENTIAL METHOD: ABNORMAL
EOSINOPHIL # BLD AUTO: 0.1 K/UL (ref 0–0.5)
EOSINOPHIL NFR BLD: 1.2 % (ref 0–8)
ERYTHROCYTE [DISTWIDTH] IN BLOOD BY AUTOMATED COUNT: 11.3 % (ref 11.5–14.5)
EST. GFR  (AFRICAN AMERICAN): >60 ML/MIN/1.73 M^2
EST. GFR  (NON AFRICAN AMERICAN): >60 ML/MIN/1.73 M^2
GLUCOSE SERPL-MCNC: 100 MG/DL (ref 70–110)
HCT VFR BLD AUTO: 36.6 % (ref 40–54)
HGB BLD-MCNC: 13 G/DL (ref 14–18)
IMM GRANULOCYTES # BLD AUTO: 0.01 K/UL (ref 0–0.04)
IMM GRANULOCYTES NFR BLD AUTO: 0.1 % (ref 0–0.5)
LACTATE SERPL-SCNC: 0.6 MMOL/L (ref 0.5–2.2)
LYMPHOCYTES # BLD AUTO: 2.1 K/UL (ref 1–4.8)
LYMPHOCYTES NFR BLD: 27.5 % (ref 18–48)
MCH RBC QN AUTO: 31.3 PG (ref 27–31)
MCHC RBC AUTO-ENTMCNC: 35.5 G/DL (ref 32–36)
MCV RBC AUTO: 88 FL (ref 82–98)
MONOCYTES # BLD AUTO: 0.5 K/UL (ref 0.3–1)
MONOCYTES NFR BLD: 6.8 % (ref 4–15)
NEUTROPHILS # BLD AUTO: 4.9 K/UL (ref 1.8–7.7)
NEUTROPHILS NFR BLD: 63.6 % (ref 38–73)
NRBC BLD-RTO: 0 /100 WBC
PLATELET # BLD AUTO: 290 K/UL (ref 150–450)
PMV BLD AUTO: 9.3 FL (ref 9.2–12.9)
POTASSIUM SERPL-SCNC: 3.4 MMOL/L (ref 3.5–5.1)
RBC # BLD AUTO: 4.15 M/UL (ref 4.6–6.2)
SODIUM SERPL-SCNC: 138 MMOL/L (ref 136–145)
WBC # BLD AUTO: 7.63 K/UL (ref 3.9–12.7)

## 2022-02-01 PROCEDURE — 25000003 PHARM REV CODE 250: Performed by: NURSE PRACTITIONER

## 2022-02-01 PROCEDURE — 99233 SBSQ HOSP IP/OBS HIGH 50: CPT | Mod: ,,, | Performed by: NURSE PRACTITIONER

## 2022-02-01 PROCEDURE — C9399 UNCLASSIFIED DRUGS OR BIOLOG: HCPCS | Performed by: EMERGENCY MEDICINE

## 2022-02-01 PROCEDURE — 25000003 PHARM REV CODE 250: Performed by: EMERGENCY MEDICINE

## 2022-02-01 PROCEDURE — 80048 BASIC METABOLIC PNL TOTAL CA: CPT | Performed by: NURSE PRACTITIONER

## 2022-02-01 PROCEDURE — 36415 COLL VENOUS BLD VENIPUNCTURE: CPT | Performed by: NURSE PRACTITIONER

## 2022-02-01 PROCEDURE — 85025 COMPLETE CBC W/AUTO DIFF WBC: CPT | Performed by: NURSE PRACTITIONER

## 2022-02-01 PROCEDURE — 82550 ASSAY OF CK (CPK): CPT | Performed by: NURSE PRACTITIONER

## 2022-02-01 PROCEDURE — 99233 PR SUBSEQUENT HOSPITAL CARE,LEVL III: ICD-10-PCS | Mod: ,,, | Performed by: NURSE PRACTITIONER

## 2022-02-01 PROCEDURE — 83605 ASSAY OF LACTIC ACID: CPT | Performed by: NURSE PRACTITIONER

## 2022-02-01 RX ORDER — POTASSIUM CHLORIDE 20 MEQ/1
40 TABLET, EXTENDED RELEASE ORAL ONCE
Status: COMPLETED | OUTPATIENT
Start: 2022-02-01 | End: 2022-02-01

## 2022-02-01 RX ORDER — FAMOTIDINE 20 MG/1
20 TABLET, FILM COATED ORAL 2 TIMES DAILY
Status: DISCONTINUED | OUTPATIENT
Start: 2022-02-01 | End: 2022-02-01 | Stop reason: HOSPADM

## 2022-02-01 RX ADMIN — DEXMEDETOMIDINE HYDROCHLORIDE 0.6 MCG/KG/HR: 4 INJECTION INTRAVENOUS at 05:02

## 2022-02-01 RX ADMIN — MUPIROCIN: 20 OINTMENT TOPICAL at 08:02

## 2022-02-01 RX ADMIN — FAMOTIDINE 20 MG: 20 TABLET ORAL at 08:02

## 2022-02-01 RX ADMIN — POTASSIUM CHLORIDE 40 MEQ: 1500 TABLET, EXTENDED RELEASE ORAL at 08:02

## 2022-02-01 NOTE — ASSESSMENT & PLAN NOTE
Endorses snorting methamphetamine  Off precedex  Last prn benzo 7pm last night  Initiate diet, mobilize  Likely home or to floor later this morning

## 2022-02-01 NOTE — PLAN OF CARE
[facility] - Intensive Care (Hospital)  Discharge Final Note    Primary Care Provider: Primary Doctor No    Expected Discharge Date: 2/1/2022    Final Discharge Note (most recent)     Final Note - 02/01/22 1032        Final Note    Assessment Type Final Discharge Note     Anticipated Discharge Disposition Home or Self Care     Hospital Resources/Appts/Education Provided Community resources provided        Post-Acute Status    Discharge Delays None known at this time                 Important Message from Medicare             Contact Info     No, Primary Doctor   Relationship: PCP - General        Next Steps: Follow up in 2 week(s)

## 2022-02-01 NOTE — PLAN OF CARE
Problem: Restraint, Nonbehavioral (Nonviolent)  Goal: Absence of Harm or Injury  Outcome: Met     Problem: Adult Inpatient Plan of Care  Goal: Patient-Specific Goal (Individualized)  Flowsheets (Taken 2/1/2022 0707)  Individualized Care Needs: BSWR D/C'd on 2/1/22 @ 0615

## 2022-02-01 NOTE — PROGRESS NOTES
[facility] - Intensive Care ([facility])  Critical Care Medicine  Progress Note    Patient Name: Edinson Hamm  MRN: [id number]  Admission Date: 1/31/2022  Hospital Length of Stay: 1 days  Code Status: Full Code  Attending Physician: Nishant aKpoor MD   Primary Care Provider: Primary Doctor No   Principal Problem: Amphetamine and psychostimulant intoxication with delirium      Subjective:     HPI:  46 year old male with unknown PMH presented to ED with hyperactive agitation  Brought in by friend who reported pt awakening this am agitated, yelling, and would only answer that he had rectal pain    ED evaluation revealed leukocytosis, low TSH 0.328, low CO2 20, UDS presumptive + opiates and amphetamines  Labs otherwise unremarkable  Required multiple modalities to attempt sedation in ED for aggressive agitation. Given benadryl IV, ativan total 3mg IV, geodon 20mg IM, haldol 5mg    Admitted to ICU with precedex infusion and soft restraints, still agitated despite all above + precedex bolus      Hospital/ICU Course:  Seen and examined on arrival to ICU  Agitated, hyperactive, only momentarily redirectable on precedex max infusion  2/1 - last benzo 7pm last night; precedex stopped 0600; fatigued but oriented and appropriate behaviour. Denies pain, SOB, nausea          Review of Systems   Unable to perform ROS: Other   Constitutional: Positive for fatigue.   HENT: Negative for sore throat.    Respiratory: Negative for shortness of breath.    Cardiovascular: Negative for chest pain.   Gastrointestinal: Negative for abdominal pain and nausea.   Musculoskeletal: Negative for myalgias.   Neurological: Negative for tremors, speech difficulty and headaches.   Psychiatric/Behavioral: The patient is not nervous/anxious.      Objective:     Vital Signs (Most Recent):  Temp: 98 °F (36.7 °C) (02/01/22 0315)  Pulse: 61 (02/01/22 0600)  Resp: (!) 21 (02/01/22 0600)  BP: 139/80 (02/01/22 0600)  SpO2: 99 % (02/01/22 0600) Vital Signs (24h  Range):  Temp:  [97.7 °F (36.5 °C)-98 °F (36.7 °C)] 98 °F (36.7 °C)  Pulse:  [] 61  Resp:  [16-32] 21  SpO2:  [96 %-100 %] 99 %  BP: (111-162)/(73-97) 139/80     Weight: 63.5 kg (139 lb 15.9 oz)  Body mass index is 18.99 kg/m².      Intake/Output Summary (Last 24 hours) at 2/1/2022 0714  Last data filed at 2/1/2022 0646  Gross per 24 hour   Intake 3422.04 ml   Output 650 ml   Net 2772.04 ml         Physical Exam  Vitals and nursing note reviewed.   Constitutional:       General: He is not in acute distress.     Appearance: He is underweight. He is ill-appearing.   HENT:      Head: Atraumatic.   Eyes:      Conjunctiva/sclera: Conjunctivae normal.      Pupils: Pupils are equal, round, and reactive to light.   Cardiovascular:      Rate and Rhythm: Normal rate and regular rhythm.      Pulses:           Radial pulses are 2+ on the right side and 2+ on the left side.        Dorsalis pedis pulses are 2+ on the right side and 2+ on the left side.   Pulmonary:      Effort: Pulmonary effort is normal.      Breath sounds: Decreased breath sounds present.   Abdominal:      General: Bowel sounds are normal. There is no distension.      Palpations: Abdomen is soft.   Musculoskeletal:      Right lower leg: No edema.      Left lower leg: No edema.   Skin:     General: Skin is warm and dry.      Capillary Refill: Capillary refill takes less than 2 seconds.   Neurological:      GCS: GCS eye subscore is 3. GCS verbal subscore is 5. GCS motor subscore is 6.   Psychiatric:         Attention and Perception: He is inattentive.         Mood and Affect: Mood is not anxious.         Speech: Speech normal.         Behavior: Behavior is not agitated or hyperactive.         Thought Content: Thought content normal.         Judgment: Judgment is not impulsive.         Vents:       Lines/Drains/Airways     Peripheral Intravenous Line                 Peripheral IV - Single Lumen 01/31/22 1145 20 G Right Upper Arm <1 day         Peripheral IV  - Single Lumen 01/31/22 1400 20 G Left Hand <1 day                Significant Labs:    CBC/Anemia Profile:  Recent Labs   Lab 01/31/22  0726 02/01/22  0428   WBC 13.52* 7.63   HGB 13.8* 13.0*   HCT 39.2* 36.6*    290   MCV 88 88   RDW 11.6 11.3*        Chemistries:  Recent Labs   Lab 01/31/22  0726 02/01/22  0428    138   K 3.9 3.4*    106   CO2 20* 23   BUN 14 14   CREATININE 0.8 0.7   CALCIUM 9.5 8.2*   ALBUMIN 4.4  --    PROT 7.5  --    BILITOT 0.8  --    ALKPHOS 65  --    ALT 17  --    AST 28  --        All pertinent labs within the past 24 hours have been reviewed.    Significant Imaging:   I have reviewed all pertinent imaging results/findings within the past 24 hours.      ABG  No results for input(s): PH, PO2, PCO2, HCO3, BE in the last 168 hours.  Assessment/Plan:     Psychiatric  * Amphetamine and psychostimulant intoxication with delirium  Endorses snorting methamphetamine  Off precedex  Last prn benzo 7pm last night  Initiate diet, mobilize  Likely home or to floor later this morning    Oncology  Leukocytosis  Likely reactive  Monitor trend with control of agitation post IVF resuscitation  No evidence of infectious focus  2/1 resolved      Critical Care Daily Checklist:    A: Awake: RASS Goal/Actual Goal: RASS Goal: 0-->alert and calm  Actual: Jesus Agitation Sedation Scale (RASS): Restless   B: Spontaneous Breathing Trial Performed?     C: SAT & SBT Coordinated?  n/a                      D: Delirium: CAM-ICU Overall CAM-ICU: Positive   E: Early Mobility Performed? Yes   F: Feeding Goal:    Status:     Current Diet Order   Procedures    Diet Adult Regular (IDDSI Level 7)      AS: Analgesia/Sedation none   T: Thromboembolic Prophylaxis lovenox   H: HOB > 300 Yes   U: Stress Ulcer Prophylaxis (if needed) pepcid   G: Glucose Control monitor   B: Bowel Function     I: Indwelling Catheter (Lines & Chavez) Necessity reviewed   D: De-escalation of Antimicrobials/Pharmacotherapies reviewed     Plan for the day/ETD As above    Code Status:  Family/Goals of Care: Full Code  Home on discharge   I have discussed case and plan of care in detail with Dr Costello and Dr Kapoor; Status and plan of care were discussed with team on multidisciplinary rounds.  Ok from Pulm/CC standpoint for transfer out or discharge home today. We will sign off if transfer out to inpt floor.     PANKAJ Emanuel-BC  Critical Care Medicine  [facility] - Intensive Care (Hospital)

## 2022-02-01 NOTE — NURSING
Pt AAOx4, restraints D/C'd at 0615 and precedex gtt turned off at 0617. Pt tolerating well. Will continue to monitor.

## 2022-02-01 NOTE — NURSING
Patient doing well. Off sedation and out of restraints x2.5 hours. Oriented to self and situation, knows he is in hospital and he states he overdosed on drugs (states heroin and meth). Eating and drinking well, VSS, removed all saline locks, removed all monitoring devices. Reviewed discharge instructions with patient, copy of discharge instructions given to pt. Patient placed in paper scrubs top and bottoms. Patient has no personal belongings present. Called Romana, patient's friend, who is coming to pick him up.

## 2022-02-01 NOTE — HOSPITAL COURSE
Patient admitted for acute intoxication from polysubstance abuse. He was started on precedex drip and monitored in the icu. Precedex was weaned off and mentation was back at baseline. He was discharged home.

## 2022-02-01 NOTE — ASSESSMENT & PLAN NOTE
Likely reactive  Monitor trend with control of agitation post IVF resuscitation  No evidence of infectious focus  2/1 resolved

## 2022-02-01 NOTE — PLAN OF CARE
Problem: Adult Inpatient Plan of Care  Goal: Plan of Care Review  Outcome: Ongoing, Progressing  Pt now AAOx4, became more alert and oriented throughout shift. Afebrile. NSR- Travon on monitor. BP stable. Weaning precedex gtt down to maintain RASS goal of 0, pt tolerating well. Pt voids per urinal with minimal assistance. Pt repositions independently in bed. BSWR in place for pt safety. POC reviewed with pt, verbalized understanding. Will continue with current POC and update as needed.

## 2022-02-01 NOTE — PLAN OF CARE
[facility] - [facility]ensive [facility] (Hospital)  Initial Discharge Assessment       Primary Care Provider: Primary Doctor No    Admission Diagnosis: Drug abuse [F19.10]  AMS (altered mental status) [R41.82]    Admission Date: 1/31/2022  Expected Discharge Date: 2/1/2022    Discharge Barriers Identified: Unis[employer]    Payor: /     Extended Emergency Contact Information  Primary Emergency Contact: Romana Jones  Mobile Phone: [phone]  Relation: Friend    Discharge Plan A: Home       No Pharmacies Listed    Initial Assessment (most recent)     Adult Discharge Assessment - 02/01/22 1024        Discharge Assessment    Assessment Type Discharge Planning Assessment     Confirmed/corrected address, phone number and insurance Yes     Confirmed Demographics Correct on Facesheet     Source of Information patient     When was your last doctors appointment? --   unknown    Communicated ASHUTOSH with patient/caregiver Yes     Reason For Admission Amphetamine and psychostimulant intoxication with delirium     Lives With friend(s)     Facility Arrived From: home     Do you expect to return to your current living situation? Yes     Do you have help at home or someone to help you manage your care at home? Yes     Who are your caregiver(s) and their phone number(s)? friends     Prior to hospitilization cognitive status: Alert/Oriented     Current cognitive status: Alert/Oriented     Walking or Climbing Stairs Difficulty none     Dressing/Bathing Difficulty none     Home Accessibility wheelchair accessible     Home Layout Able to live on 1st floor     Equipment Currently Used at Home none     Readmission within 30 days? No     Patient currently being followed by outpatient case management? No     Do you currently have service(s) that help you manage your care at home? No     Do you take prescription medications? No     Do you have prescription coverage? No     How do you get to doctors appointments? family or friend will provide     Are you on  dialysis? No     Do you take coumadin? No     Discharge Plan A Home     DME Needed Upon Discharge  none     Discharge Plan discussed with: Patient     Discharge Barriers Identified Unisured                 CM met with patient at bedside to introduce role and discuss discharge planning. Patient lives with friends who will also be help at home and can provide transport. Currently no CM needs. CM will continue following to assist with other needs.

## 2022-02-01 NOTE — DISCHARGE SUMMARY
[facility] ([facility])  Hospital Medicine  Discharge Summary      Patient Name: Edinson Hamm  MRN: [id number]  Patient Class: IP- Inpatient  Admission Date: 1/31/2022  Hospital Length of Stay: 1 days  Discharge Date and Time:  02/01/2022 9:48 AM  Attending Physician: Nishant Kapoor MD   Discharging Provider: Nishant Kapoor MD  Primary Care Provider: Primary Doctor No      HPI:   Hansel is a 46 y.o. male patient who presents to the ED for AMS, onset this morning. Friend states that the pt woke up this morning yelling and would not answer questions. Per friend, pt was only able to verbalize that he had rectal pain. He was brought to the ED by his friend.  In the ED: Drug screen presumptive positive for opioids and amphetamines. Several modalities tried in order to calm patient down but his agitation and thrashing warranted ICU admission for Precedex gtt. Alcohol 81, WBC 13.52.   HM contacted for admission to inpatient. Critical care team consulted.      * No surgery found *      Hospital Course:   Patient admitted for acute intoxication from polysubstance abuse. He was started on precedex drip and monitored in the icu. Precedex was weaned off and mentation was back at baseline. He was discharged home.        Goals of Care Treatment Preferences:  Code Status: Full Code      Consults:   Consults (From admission, onward)        Status Ordering Provider     Inpatient consult to Critical Care Medicine  Once        Provider:  PANKAJ Emanuel-KRISTI Moore          No new Assessment & Plan notes have been filed under this hospital service since the last note was generated.  Service: Hospital Medicine    Final Active Diagnoses:    Diagnosis Date Noted POA    PRINCIPAL PROBLEM:  Amphetamine and psychostimulant intoxication with delirium [F15.921] 01/31/2022 Yes    Leukocytosis [D72.829] 01/31/2022 Yes    Agitation [R45.1] 01/31/2022 Yes    AMS (altered mental status) [R41.82] 01/31/2022 Yes       Problems Resolved During this Admission:       Discharged Condition: good    Disposition: Home or Self Care    Follow Up:   Follow-up Information     Primary Doctor No In 2 weeks.                     Patient Instructions:   No discharge procedures on file.    Significant Diagnostic Studies: Labs:   BMP:   Recent Labs   Lab 01/31/22  0726 02/01/22 0428   GLU 91 100    138   K 3.9 3.4*    106   CO2 20* 23   BUN 14 14   CREATININE 0.8 0.7   CALCIUM 9.5 8.2*    and CBC   Recent Labs   Lab 01/31/22  0726 01/31/22 0726 02/01/22 0428   WBC 13.52*  --  7.63   HGB 13.8*  --  13.0*   HCT 39.2*   < > 36.6*     --  290    < > = values in this interval not displayed.       Pending Diagnostic Studies:     None         Medications:  Reconciled Home Medications:      Medication List      You have not been prescribed any medications.         Indwelling Lines/Drains at time of discharge:   Lines/Drains/Airways     None                 Time spent on the discharge of patient: 36 minutes    Critical care time spent on the evaluation and treatment of severe organ dysfunction, review of pertinent labs and imaging studies, discussions with consulting providers and discussions with patient/family: 36 minutes.     Nishant Kapoor MD  Department of Hospital Medicine  [facility] ([facility])

## 2022-02-01 NOTE — SUBJECTIVE & OBJECTIVE
Review of Systems   Unable to perform ROS: Other   Constitutional: Positive for fatigue.   HENT: Negative for sore throat.    Respiratory: Negative for shortness of breath.    Cardiovascular: Negative for chest pain.   Gastrointestinal: Negative for abdominal pain and nausea.   Musculoskeletal: Negative for myalgias.   Neurological: Negative for tremors, speech difficulty and headaches.   Psychiatric/Behavioral: The patient is not nervous/anxious.      Objective:     Vital Signs (Most Recent):  Temp: 98 °F (36.7 °C) (02/01/22 0315)  Pulse: 61 (02/01/22 0600)  Resp: (!) 21 (02/01/22 0600)  BP: 139/80 (02/01/22 0600)  SpO2: 99 % (02/01/22 0600) Vital Signs (24h Range):  Temp:  [97.7 °F (36.5 °C)-98 °F (36.7 °C)] 98 °F (36.7 °C)  Pulse:  [] 61  Resp:  [16-32] 21  SpO2:  [96 %-100 %] 99 %  BP: (111-162)/(73-97) 139/80     Weight: 63.5 kg (139 lb 15.9 oz)  Body mass index is 18.99 kg/m².      Intake/Output Summary (Last 24 hours) at 2/1/2022 0714  Last data filed at 2/1/2022 0646  Gross per 24 hour   Intake 3422.04 ml   Output 650 ml   Net 2772.04 ml         Physical Exam  Vitals and nursing note reviewed.   Constitutional:       General: He is not in acute distress.     Appearance: He is underweight. He is ill-appearing.   HENT:      Head: Atraumatic.   Eyes:      Conjunctiva/sclera: Conjunctivae normal.      Pupils: Pupils are equal, round, and reactive to light.   Cardiovascular:      Rate and Rhythm: Normal rate and regular rhythm.      Pulses:           Radial pulses are 2+ on the right side and 2+ on the left side.        Dorsalis pedis pulses are 2+ on the right side and 2+ on the left side.   Pulmonary:      Effort: Pulmonary effort is normal.      Breath sounds: Decreased breath sounds present.   Abdominal:      General: Bowel sounds are normal. There is no distension.      Palpations: Abdomen is soft.   Musculoskeletal:      Right lower leg: No edema.      Left lower leg: No edema.   Skin:      General: Skin is warm and dry.      Capillary Refill: Capillary refill takes less than 2 seconds.   Neurological:      GCS: GCS eye subscore is 3. GCS verbal subscore is 5. GCS motor subscore is 6.   Psychiatric:         Attention and Perception: He is inattentive.         Mood and Affect: Mood is not anxious.         Speech: Speech normal.         Behavior: Behavior is not agitated or hyperactive.         Thought Content: Thought content normal.         Judgment: Judgment is not impulsive.         Vents:       Lines/Drains/Airways     Peripheral Intravenous Line                 Peripheral IV - Single Lumen 01/31/22 1145 20 G Right Upper Arm <1 day         Peripheral IV - Single Lumen 01/31/22 1400 20 G Left Hand <1 day                Significant Labs:    CBC/Anemia Profile:  Recent Labs   Lab 01/31/22  0726 02/01/22  0428   WBC 13.52* 7.63   HGB 13.8* 13.0*   HCT 39.2* 36.6*    290   MCV 88 88   RDW 11.6 11.3*        Chemistries:  Recent Labs   Lab 01/31/22  0726 02/01/22  0428    138   K 3.9 3.4*    106   CO2 20* 23   BUN 14 14   CREATININE 0.8 0.7   CALCIUM 9.5 8.2*   ALBUMIN 4.4  --    PROT 7.5  --    BILITOT 0.8  --    ALKPHOS 65  --    ALT 17  --    AST 28  --        All pertinent labs within the past 24 hours have been reviewed.    Significant Imaging:   I have reviewed all pertinent imaging results/findings within the past 24 hours.